# Patient Record
Sex: FEMALE | Race: WHITE
[De-identification: names, ages, dates, MRNs, and addresses within clinical notes are randomized per-mention and may not be internally consistent; named-entity substitution may affect disease eponyms.]

---

## 2019-08-21 ENCOUNTER — HOSPITAL ENCOUNTER (OUTPATIENT)
Dept: HOSPITAL 75 - RAD FS | Age: 69
End: 2019-08-21
Attending: NURSE PRACTITIONER
Payer: MEDICARE

## 2019-08-21 DIAGNOSIS — M17.12: Primary | ICD-10-CM

## 2019-08-21 PROCEDURE — 73562 X-RAY EXAM OF KNEE 3: CPT

## 2019-08-21 NOTE — DIAGNOSTIC IMAGING REPORT
INDICATION: 

Left knee pain.



FINDINGS:

Three views of the left knee show slight narrowing of the medial

tibiofemoral joint space. The medial joint space measures 4 mm in

thickness whereas the lateral compartment measures 5 mm in

thickness. There are small osteophytes at the margins of the

articular surfaces. There are small osteophytes at the margins of

the patellofemoral joint.



IMPRESSION: 

Mild degenerative changes in the medial, tibiofemoral, and

patellofemoral joint spaces.



Dictated by: 



  Dictated on workstation # [id number]

## 2020-05-19 ENCOUNTER — HOSPITAL ENCOUNTER (OUTPATIENT)
Dept: HOSPITAL 75 - RAD FS | Age: 70
End: 2020-05-19
Attending: NURSE PRACTITIONER
Payer: MEDICARE

## 2020-05-19 DIAGNOSIS — M47.817: ICD-10-CM

## 2020-05-19 DIAGNOSIS — M51.17: Primary | ICD-10-CM

## 2020-05-19 PROCEDURE — 72100 X-RAY EXAM L-S SPINE 2/3 VWS: CPT

## 2020-05-19 NOTE — DIAGNOSTIC IMAGING REPORT
INDICATION: Back pain



AP and lateral views of the lumbar spine are obtained



The lumbar vertebrae are normal in height with no compression

deformity. There is disc space narrowing of mild to moderate

severity at L5-S1, L4-L5, and L3-L4. There is slight

retrolisthesis of L3-L4 by about 2 to 3 mm. There is diffuse

facet degenerative change from L3 through S1.



IMPRESSION:



Degenerative findings in the lumbar spine as listed above. Slight

retrolisthesis of L3-L4. No acute bony abnormality.



Dictated by: 



  Dictated on workstation # [id number]

## 2020-12-27 ENCOUNTER — HOSPITAL ENCOUNTER (EMERGENCY)
Dept: HOSPITAL 75 - ER FS | Age: 70
Discharge: HOME | End: 2020-12-27
Payer: MEDICARE

## 2020-12-27 VITALS — HEIGHT: 60.98 IN | BODY MASS INDEX: 40.96 KG/M2 | WEIGHT: 216.93 LBS

## 2020-12-27 VITALS — SYSTOLIC BLOOD PRESSURE: 144 MMHG | DIASTOLIC BLOOD PRESSURE: 61 MMHG

## 2020-12-27 DIAGNOSIS — F41.9: ICD-10-CM

## 2020-12-27 DIAGNOSIS — Z88.5: ICD-10-CM

## 2020-12-27 DIAGNOSIS — G89.18: Primary | ICD-10-CM

## 2020-12-27 LAB
ALBUMIN SERPL-MCNC: 3.6 GM/DL (ref 3.2–4.5)
ALP SERPL-CCNC: 106 U/L (ref 40–136)
ALT SERPL-CCNC: 11 U/L (ref 0–55)
BASOPHILS # BLD AUTO: 0 10^3/UL (ref 0–0.1)
BASOPHILS NFR BLD AUTO: 1 % (ref 0–10)
BILIRUB SERPL-MCNC: 1 MG/DL (ref 0.1–1)
BUN/CREAT SERPL: 30
CALCIUM SERPL-MCNC: 9.2 MG/DL (ref 8.5–10.1)
CHLORIDE SERPL-SCNC: 100 MMOL/L (ref 98–107)
CO2 SERPL-SCNC: 25 MMOL/L (ref 21–32)
CREAT SERPL-MCNC: 0.5 MG/DL (ref 0.6–1.3)
EOSINOPHIL # BLD AUTO: 0.1 10^3/UL (ref 0–0.3)
EOSINOPHIL NFR BLD AUTO: 2 % (ref 0–10)
GFR SERPLBLD BASED ON 1.73 SQ M-ARVRAT: > 60 ML/MIN
GLUCOSE SERPL-MCNC: 177 MG/DL (ref 70–105)
HCT VFR BLD CALC: 31 % (ref 35–52)
HGB BLD-MCNC: 10.2 G/DL (ref 11.5–16)
LYMPHOCYTES # BLD AUTO: 0.8 X 10^3 (ref 1–4)
LYMPHOCYTES NFR BLD AUTO: 9 % (ref 12–44)
MANUAL DIFFERENTIAL PERFORMED BLD QL: NO
MCH RBC QN AUTO: 27 PG (ref 25–34)
MCHC RBC AUTO-ENTMCNC: 33 G/DL (ref 32–36)
MCV RBC AUTO: 82 FL (ref 80–99)
MONOCYTES # BLD AUTO: 0.6 X 10^3 (ref 0–1)
MONOCYTES NFR BLD AUTO: 7 % (ref 0–12)
NEUTROPHILS # BLD AUTO: 7 X 10^3 (ref 1.8–7.8)
NEUTROPHILS NFR BLD AUTO: 80 % (ref 42–75)
PLATELET # BLD: 386 10^3/UL (ref 130–400)
PMV BLD AUTO: 9.3 FL (ref 7.4–10.4)
POTASSIUM SERPL-SCNC: 4 MMOL/L (ref 3.6–5)
PROT SERPL-MCNC: 6.5 GM/DL (ref 6.4–8.2)
SODIUM SERPL-SCNC: 137 MMOL/L (ref 135–145)
WBC # BLD AUTO: 8.7 10^3/UL (ref 4.3–11)

## 2020-12-27 PROCEDURE — 36415 COLL VENOUS BLD VENIPUNCTURE: CPT

## 2020-12-27 PROCEDURE — 80053 COMPREHEN METABOLIC PANEL: CPT

## 2020-12-27 PROCEDURE — 73560 X-RAY EXAM OF KNEE 1 OR 2: CPT

## 2020-12-27 PROCEDURE — 86141 C-REACTIVE PROTEIN HS: CPT

## 2020-12-27 PROCEDURE — 85025 COMPLETE CBC W/AUTO DIFF WBC: CPT

## 2020-12-27 NOTE — DIAGNOSTIC IMAGING REPORT
EXAMINATION: Left knee radiographs, 2 views.



COMPARISON: August 21, 2019. 



HISTORY: 70-year-old female, swelling and redness. 



FINDINGS: There is a total left knee prosthesis. The hardware

appears intact. There is no periprosthetic lucency. There is no

cortical or aggressive bone destruction. There is no identified

acute fracture. There is no large knee joint effusion. There is

no identified unexpected radiopaque foreign body. 



IMPRESSION: Intact total left knee prosthesis without apparent

complication. 



Dictated by: 



  Dictated on workstation # [id number]

## 2020-12-28 NOTE — ED GENERAL
General


Chief Complaint:  Lower Extremity


Stated Complaint:  LEFT LEG SWOLLEN,HOT TO TOUCH: SURGERY ON 12/21


Nursing Triage Note:  


PT ARRIVED BY PRIVATE VEHICLE WITH CHIEF COMPLAINT OF LEFT KNEE/LEG SWELLING. PT




WAS ALERT, ORIENTED X 4 AND WHEELED TO ROOM 4 WHERE HER VITAL SIGNS WERE TAKEN. 


DOPPLER WAS USED TO FIND DORSALIS PEDIS AND POSTERIOR TIBIALIS. PT HAD SURGERY 


AT ORTHO 4 STATES ON 12/21 AND LEFT ON 12/24. PT HAD LAST DRESSING CHANGE ON 


12/22. THERE IS A DRAINAGE SPOT ON HER DRESSING. PT HAS SEVERE SWELLING IN LEFT 


LEG AND BRUISING. PT STATED SHE TAKES A BABY ASPIRIN 81 MG A DAY AND OXYCODONE 


EVERY 4 HOURS FOR PAIN. PT HAS BEEN TRYING TO WALK ON LEG AND GET UP AND DOWN, 


BUT IN A LOT OF PAIN. PT IS SUPPOSED TO DO PHYSICIAL THERAPY ON MONDAY. PT 


STATED IT IS WARM TO THE TOUCH. PT STATED SHE DOES NOT LIKE HYDROCODENE.


Nursing Sepsis Screen:  No Definite Risk


Source of Information:  Patient


Exam Limitations:  No Limitations





History of Present Illness


Date Seen by Provider:  Dec 28, 2020


Time Seen by Provider:  17:30


Initial Comments


Patient is a 70-year-old female who is 6 days status post left knee replacement 

presents with increased left leg pain swelling left knee. Patient reports 

increased pain with ambulation and movement. Denies falls or injuries. No fevers

chills, nausea vomiting or sweats. Denies increased drainage from bandage. 

Patient states she was walking on the leg for 3 hours last evening prior to leg 

hurting today. No chest pain shortness of breath. No other symptoms or 

complaints. Patient is not currently anticoagulated.


Timing/Duration:  12-24 Hours


Severity:  Moderate


Modifying Factors:  improves with Movement


Associated Systoms:  Other





Allergies and Home Medications


Allergies


Coded Allergies:  


     hydrocodone (Verified  Allergy, Unknown, 12/27/20)





Patient Home Medication List


Home Medication List Reviewed:  Yes





Review of Systems


Review of Systems


Constitutional:  see HPI


EENTM:  see HPI


Respiratory:  see HPI


Cardiovascular:  see HPI


Gastrointestinal:  see HPI


Genitourinary:  see HPI


Musculoskeletal:  see HPI


Skin:  see HPI


Psychiatric/Neurological:  See HPI


Hematologic/Lymphatic:  See HPI


Immunological/Allergic:  see HPI





All Other Systems Reviewed


Negative Unless Noted:  Yes





Past Medical-Social-Family Hx


Past Med/Social Hx:  Reviewed Nursing Past Med/Soc Hx


Patient Social History


Recent Foreign Travel:  No


Contact w/Someone Who Travel:  No


Recent Infectious Disease Expo:  No


Recent Hopitalizations:  Yes (LEFT KNEE REPLACEMENT)


Physical Abuse:  No


Sexual Abuse:  No


Mistreated:  No


Fear:  No





Seasonal Allergies


Seasonal Allergies:  No





Past Medical History


Surgeries:  Yes (BILATERAL KNEE REPLACEMENT)


Adenoidectomy, Gallbladder, Joint Replacement, Tonsillectomy


Respiratory:  No


Cardiac:  Yes


Hypertension


Neurological:  No


Genitourinary:  No


Gastrointestinal:  No


Musculoskeletal:  Yes (OSTEOARTHRITIS, BILAT KNEE REPLACMENT)


Arthritis


Endocrine:  No (ON BOARDER FOR DM)


HEENT:  No


Cancer:  No


Psychosocial:  No


Blood Disorders:  No





Physical Exam


Vital Signs





Vital Signs - First Documented








 12/27/20





 15:20


 


Temp 37.2


 


Pulse 100


 


Resp 20


 


B/P (MAP) 150/77 (101)


 


Pulse Ox 98


 


O2 Delivery Room Air





Capillary Refill : Less Than 3 Seconds


Height, Weight, BMI


Height: '"


Weight: lbs. oz. kg; 41.00 BMI


Method:


General Appearance:  Anxious, Mild Distress


Eyes:  Bilateral Eye Normal Inspection


HEENT:  PERRL/EOMI, Pharynx Normal


Neck:  Full Range of Motion, Normal Inspection, Non Tender


Extremity:  Swelling (midline incision left leg/knee, wound clean dry and 

intact. The joint is not hot or is excessively painful with range of motion. 

Extensive old bruising for a anterior leg. Pulses present. Calf does not feel 

tense), Other


Neurologic/Psychiatric:  Oriented x3, No Motor/Sensory Deficits





Focused Exam


Sepsis Stage:  Ruled Out





Progress/Results/Core Measures


Suspected Sepsis


Recent Fever Within 48 Hours:  No


Infection Criteria Present:  Suspected New Infection


New/Unexplained  Altered Menta:  No


Sepsis Screen:  No Definite Risk


SIRS


Temperature: 


Pulse: 92 


Respiratory Rate: 18


 


Laboratory Tests


12/27/20 15:40: White Blood Count 8.7


Blood Pressure 144 /61 


Mean: 101


 


Laboratory Tests


12/27/20 15:40: 


Creatinine 0.50L, Platelet Count 386, Total Bilirubin 1.0








Results/Orders


Lab Results





Laboratory Tests








Test


 12/27/20


15:40 Range/Units


 


 


White Blood Count


 8.7 


 4.3-11.0


10^3/uL


 


Red Blood Count


 3.78 L


 4.35-5.85


10^6/uL


 


Hemoglobin 10.2 L 11.5-16.0  G/DL


 


Hematocrit 31 L 35-52  %


 


Mean Corpuscular Volume 82  80-99  FL


 


Mean Corpuscular Hemoglobin 27  25-34  PG


 


Mean Corpuscular Hemoglobin


Concent 33 


 32-36  G/DL





 


Red Cell Distribution Width 14.1  10.0-14.5  %


 


Platelet Count


 386 


 130-400


10^3/uL


 


Mean Platelet Volume 9.3  7.4-10.4  FL


 


Immature Granulocyte % (Auto) 2   %


 


Neutrophils (%) (Auto) 80 H 42-75  %


 


Lymphocytes (%) (Auto) 9 L 12-44  %


 


Monocytes (%) (Auto) 7  0-12  %


 


Eosinophils (%) (Auto) 2  0-10  %


 


Basophils (%) (Auto) 1  0-10  %


 


Neutrophils # (Auto) 7.0  1.8-7.8  X 10^3


 


Lymphocytes # (Auto) 0.8 L 1.0-4.0  X 10^3


 


Monocytes # (Auto) 0.6  0.0-1.0  X 10^3


 


Eosinophils # (Auto)


 0.1 


 0.0-0.3


10^3/uL


 


Basophils # (Auto)


 0.0 


 0.0-0.1


10^3/uL


 


Immature Granulocyte # (Auto)


 0.1 


 0.0-0.1


10^3/uL


 


Sodium Level 137  135-145  MMOL/L


 


Potassium Level 4.0  3.6-5.0  MMOL/L


 


Chloride Level 100    MMOL/L


 


Carbon Dioxide Level 25  21-32  MMOL/L


 


Anion Gap 12  5-14  MMOL/L


 


Blood Urea Nitrogen 15  7-18  MG/DL


 


Creatinine


 0.50 L


 0.60-1.30


MG/DL


 


Estimat Glomerular Filtration


Rate > 60 


  





 


BUN/Creatinine Ratio 30   


 


Glucose Level 177 H   MG/DL


 


Calcium Level 9.2  8.5-10.1  MG/DL


 


Corrected Calcium 9.5  8.5-10.1  MG/DL


 


Total Bilirubin 1.0  0.1-1.0  MG/DL


 


Aspartate Amino Transf


(AST/SGOT) 11 


 5-34  U/L





 


Alanine Aminotransferase


(ALT/SGPT) 11 


 0-55  U/L





 


Alkaline Phosphatase 106    U/L


 


C-Reactive Protein 8.39 H <0.50  MG/DL


 


Total Protein 6.5  6.4-8.2  GM/DL


 


Albumin 3.6  3.2-4.5  GM/DL








Jennifer Keane - LESA COLEMAN DO


Cbc With Automated Diff (12/27/20 15:34)


Comprehensive Metabolic Panel (12/27/20 15:34)


Crp Fs (12/27/20 15:34)


Knee 2 View Left (12/27/20 15:37)


Enoxaparin Injection (Lovenox Injection) (12/27/20 17:30)





Vital Signs/I&O


Capillary Refill : Less Than 3 Seconds








Blood Pressure Mean:                    101











Departure


Communication (Admissions)


Left knee: Soft tissue swelling only.





Labyrinthine imaging reviewed. Symptoms most consistent with increased pain and 

swelling secondary to aggressive use. Infection considered but not clinically 

evident at this point. Arthrocentesis not currently indicated. Case reviewed 

with orthopedic provider who will see in clinic in 2 days. Lovenox given with 

order for CV ultrasound to performed next day. Return precautions reviewed.





Impression





   Primary Impression:  


   Postoperative pain of extremity


Disposition:  01 HOME, SELF-CARE


Condition:  Stable





Departure-Patient Inst.


Decision time for Depature:  17:15





Add. Discharge Instructions:  


Please follow up with [facility] tomorrow to arrange for outpatient US of leg and 

cotnact your orthopaeidic provider for re-evaluation in the next one two days. 





All discharge instructions reviewed with patient and/or family. Voiced 

understanding.











LESA COLEMAN DO                   Dec 28, 2020 07:03

## 2021-08-23 ENCOUNTER — HOSPITAL ENCOUNTER (OUTPATIENT)
Dept: HOSPITAL 75 - INFUSION | Age: 71
End: 2021-08-23
Attending: STUDENT IN AN ORGANIZED HEALTH CARE EDUCATION/TRAINING PROGRAM
Payer: MEDICARE

## 2021-08-23 VITALS — SYSTOLIC BLOOD PRESSURE: 147 MMHG | DIASTOLIC BLOOD PRESSURE: 57 MMHG

## 2021-08-23 VITALS — DIASTOLIC BLOOD PRESSURE: 65 MMHG | SYSTOLIC BLOOD PRESSURE: 139 MMHG

## 2021-08-23 VITALS — BODY MASS INDEX: 56.04 KG/M2 | HEIGHT: 60.63 IN | WEIGHT: 293 LBS

## 2021-08-23 DIAGNOSIS — U07.1: ICD-10-CM

## 2021-08-23 DIAGNOSIS — Z23: Primary | ICD-10-CM

## 2022-03-15 ENCOUNTER — HOSPITAL ENCOUNTER (OUTPATIENT)
Dept: HOSPITAL 75 - RAD FS | Age: 72
End: 2022-03-15
Attending: NURSE PRACTITIONER
Payer: MEDICARE

## 2022-03-15 DIAGNOSIS — M25.851: Primary | ICD-10-CM

## 2022-03-15 PROCEDURE — 73502 X-RAY EXAM HIP UNI 2-3 VIEWS: CPT

## 2022-03-15 NOTE — DIAGNOSTIC IMAGING REPORT
HISTORY: Pain in the right hip.



EXAMINATION: Right hip from 03/15/2022.



FINDINGS: Single view of the pelvis with two views of the right

hip.



There is a small linear density adjacent to the superolateral

aspect of the right acetabulum which could represent an os

acetabuli. A small fracture fragment is less likely but not

excluded. The remaining osseous structures are intact. Mild

narrowing is seen in both hips.



IMPRESSION:

1. Age-indeterminate density adjacent to the superolateral right

acetabulum. Correlate with symptoms.



Dictated by: 



  Dictated on workstation # [id number]

## 2022-03-27 ENCOUNTER — HOSPITAL ENCOUNTER (EMERGENCY)
Dept: HOSPITAL 75 - ER FS | Age: 72
Discharge: HOME | End: 2022-03-27
Payer: MEDICARE

## 2022-03-27 VITALS — SYSTOLIC BLOOD PRESSURE: 162 MMHG | DIASTOLIC BLOOD PRESSURE: 95 MMHG

## 2022-03-27 DIAGNOSIS — S00.03XA: Primary | ICD-10-CM
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
## 2022-03-27 PROCEDURE — 70450 CT HEAD/BRAIN W/O DYE: CPT

## 2022-03-27 PROCEDURE — 72125 CT NECK SPINE W/O DYE: CPT

## 2022-03-27 NOTE — ED GENERAL
General


Chief Complaint:  Trauma-Non Activation


Stated Complaint:  HEAD INJURY


Nursing Triage Note:  


Pt states she was carrying groceries up her steps and fell backward, hitting her




head on the concrete. Pt denies loc and is alert and oriented on arrival





History of Present Illness


Date Seen by Provider:  Mar 27, 2022


Time Seen by Provider:  21:15


Initial Comments


71-year-old female is here with complaints of swelling to her scalp after she 

had a fall today.  Patient had a fall backwards off her front porch steps, 2 in 

number.  She hit the back of her head on the ground but did not have LOC.  

Patient was carrying a water jug to her porch and the jug fell towards her 

causing her to fall backwards.  Denies dizziness, headache, blurry vision, chest

pain, palpitations.





Allergies and Home Medications


Allergies


Coded Allergies:  


     hydrocodone (Verified  Allergy, Unknown, 20)





Patient Home Medication List


Home Medication List Reviewed:  Yes





Review of Systems


Review of Systems


Constitutional:  no symptoms reported


EENTM:  other (scalp swelling)


Respiratory:  no symptoms reported


Cardiovascular:  no symptoms reported


Gastrointestinal:  no symptoms reported


Genitourinary:  no symptoms reported


Musculoskeletal:  no symptoms reported


Skin:  no symptoms reported


Psychiatric/Neurological:  No Symptoms Reported


Hematologic/Lymphatic:  No Symptoms Reported


Immunological/Allergic:  no symptoms reported





Past Medical-Social-Family Hx


Patient Social History


Tobacco Use?:  No


Use of E-Cig and/or Vaping dev:  No


Substance use?:  No


Alcohol Use?:  No


Pt feels they are or have been:  No





Seasonal Allergies


Seasonal Allergies:  No





Past Medical History


Surgeries:  Yes (BILATERAL KNEE REPLACEMENT)


Adenoidectomy, Gallbladder, Joint Replacement, Tonsillectomy


Respiratory:  No


Cardiac:  Yes


Hypertension


Neurological:  No


Genitourinary:  No


Gastrointestinal:  No


Musculoskeletal:  Yes (OSTEOARTHRITIS, BILAT KNEE REPLACMENT)


Arthritis


Endocrine:  No (ON BOARDER FOR DM)


HEENT:  No


Cancer:  No


Psychosocial:  No


Blood Disorders:  No





Physical Exam


Vital Signs





Vital Signs - First Documented








 3/27/22





 21:06


 


Temp 36.7


 


Pulse 80


 


Resp 18


 


B/P (MAP) 168/119 (135)


 


Pulse Ox 96


 


O2 Delivery Room Air





Capillary Refill : Less Than 3 Seconds


Height, Weight, BMI


Height: '"


Weight: lbs. oz. kg; 41.00 BMI


Method:


General Appearance:  No Apparent Distress


HEENT:  PERRL/EOMI, TMs Normal


Neck:  Full Range of Motion, Normal Inspection, Non Tender, Supple


Extremity:  Normal Inspection, Normal Range of Motion, Non Tender


Neurologic/Psychiatric:  Alert, Oriented x3, No Motor/Sensory Deficits, Normal 

Mood/Affect, CNs II-XII Norm as Tested


Skin:  Normal Color





Progress/Results/Core Measures


Suspected Sepsis


SIRS


Temperature: 


Pulse: 80 


Respiratory Rate: 18


 


Blood Pressure 168 /119 


Mean: 135





Results/Orders


My Orders





Orders - ROBERT DE LUNA MD


Ct Head/Cervical Spine Wo (3/27/22 21:11)





Vital Signs/I&O











 3/27/22





 21:06


 


Temp 36.7


 


Pulse 80


 


Resp 18


 


B/P (MAP) 168/119 (135)


 


Pulse Ox 96


 


O2 Delivery Room Air





Capillary Refill : Less Than 3 Seconds








Blood Pressure Mean:                    135








Progress Note :  


Progress Note


1. LEFT PARIETAL SCALP HEMATOMA:


- CT HEAD & C-SPINE: left parietal scalp hematoma


- F/u with PCP


- Concussion precautions discussed. No concussion at this time.


-The patient was seen in the ED, and treated appropriately to presentation at a 

specific point in time. Patient is informed that there is a possibility that 

disease and illness can evolve and change in acuity rapidly or slowly after 

patient is discharged from the ER. Precautionary advice given to the patient for

immediate return to ER if symptoms worsen or do not resolve, and to seek emergen

cy care sooner rather than later. Pt also advised on the importance of PCP 

follow up and compliance with management and follow up plan. Pt verbally 

expressed understanding.





Diagnostic Imaging





   Diagonstic Imaging:  CT


   Plain Films/CT/US/NM/MRI:  c-spine, head


Comments


                 [facility] [place], [place]





NAME:   MATILDA CIFUENTES


MED REC#:   [id number]


ACCOUNT#:   [id number]


PT STATUS:   REG ER


:   1950


PHYSICIAN:   ROBERT DE LUNA MD


ADMIT DATE:   22/ER FS


                                  ***Signed***


Date of Exam:22





CT HEAD/CERVICAL SPINE WO








PROCEDURE: CT head and CT cervical spine without contrast.





TECHNIQUE: Multiple contiguous axial images were obtained through


the brain and cervical spine without the use of intravenous


contrast. Sagittal and coronal reformations through the cervical


spine were then performed. Auto Exposure Controls were utilized


during the CT exam to meet ALARA standards for radiation dose


reduction. 





INDICATION: Fall. Head and neck pain.





COMPARISON: None.





FINDINGS: 





CT HEAD: No large acute territorial ischemia, mass or hemorrhage.


No midline shift or mass effect. Scattered areas of decreased


attenuation are seen in the periventricular and subcortical white


matter. The ventricles, cortical sulci and basilar cisterns are


patent and unremarkable. 





A scalp contusion is seen overlying the left parietal region. The


calvarium is intact. The visualized paranasal sinuses are clear.





CT CERVICAL SPINE: No acute fracture or dislocation is seen in


the cervical spine. There is reversal of the normal lordotic


curvature of the cervical spine centered at the C5-C6 level. No


focal osseous lesion. Vertebral body heights are well maintained.


The craniocervical junction is well maintained. Mild degenerative


changes are seen in the cervical spine with disc osteophyte


complexes and uncovertebral arthropathy. Soft tissues of the neck


are unremarkable. The included lung apices are clear.





IMPRESSION:  


1. No hemorrhage or focal intra-axial mass. No CT evidence of


large acute territorial ischemia.  


2. No acute fracture or dislocation in the cervical spine.


3. Scalp contusion overlying the left parietal region. No


associated calvarial fracture.


4. Scattered chronic microvascular disease in the periventricular


and subcortical white matter.





Departure


Impression





   Primary Impression:  


   Left parietal scalp hematoma


   Qualified Codes:  S00.03XA - Contusion of scalp, initial encounter


Disposition:  01 HOME, SELF-CARE


Condition:  Stable





Departure-Patient Inst.


Referrals:  


SERENA MARTINO (PCP)


Primary Care Physician








[facility] (Family)


Primary Care Physician


Patient Instructions:  Contusion (DC), Concussion, Adult (DC)





Add. Discharge Instructions:  


- F/u with PCP


- Concussion precautions discussed. No concussion at this time.


-The patient was seen in the ED, and treated appropriately to presentation at a 

specific point in time. Patient is informed that there is a possibility that 

disease and illness can evolve and change in acuity rapidly or slowly after 

patient is discharged from the ER. Precautionary advice given to the patient for

immediate return to ER if symptoms worsen or do not resolve, and to seek 

emergency care sooner rather than later. Pt also advised on the importance of 

PCP follow up and compliance with management and follow up plan. Pt verbally 

expressed understanding. 





All discharge instructions reviewed with patient and/or family. Voiced 

understanding.











ROBERT DE LUNA MD              Mar 27, 2022 21:43

## 2022-03-27 NOTE — DIAGNOSTIC IMAGING REPORT
PROCEDURE: CT head and CT cervical spine without contrast.



TECHNIQUE: Multiple contiguous axial images were obtained through

the brain and cervical spine without the use of intravenous

contrast. Sagittal and coronal reformations through the cervical

spine were then performed. Auto Exposure Controls were utilized

during the CT exam to meet ALARA standards for radiation dose

reduction. 



INDICATION: Fall. Head and neck pain.



COMPARISON: None.



FINDINGS: 



CT HEAD: No large acute territorial ischemia, mass or hemorrhage.

No midline shift or mass effect. Scattered areas of decreased

attenuation are seen in the periventricular and subcortical white

matter. The ventricles, cortical sulci and basilar cisterns are

patent and unremarkable. 



A scalp contusion is seen overlying the left parietal region. The

calvarium is intact. The visualized paranasal sinuses are clear.



CT CERVICAL SPINE: No acute fracture or dislocation is seen in

the cervical spine. There is reversal of the normal lordotic

curvature of the cervical spine centered at the C5-C6 level. No

focal osseous lesion. Vertebral body heights are well maintained.

The craniocervical junction is well maintained. Mild degenerative

changes are seen in the cervical spine with disc osteophyte

complexes and uncovertebral arthropathy. Soft tissues of the neck

are unremarkable. The included lung apices are clear.



IMPRESSION:  

1. No hemorrhage or focal intra-axial mass. No CT evidence of

large acute territorial ischemia.  

2. No acute fracture or dislocation in the cervical spine.

3. Scalp contusion overlying the left parietal region. No

associated calvarial fracture.

4. Scattered chronic microvascular disease in the periventricular

and subcortical white matter. 



 



Dictated by: 



  Dictated on workstation # [id number]

## 2022-06-27 ENCOUNTER — HOSPITAL ENCOUNTER (EMERGENCY)
Dept: HOSPITAL 75 - ER FS | Age: 72
Discharge: HOME | End: 2022-06-27
Payer: MEDICARE

## 2022-06-27 VITALS — HEIGHT: 68.9 IN | WEIGHT: 138.89 LBS | BODY MASS INDEX: 20.57 KG/M2

## 2022-06-27 VITALS — SYSTOLIC BLOOD PRESSURE: 131 MMHG | DIASTOLIC BLOOD PRESSURE: 72 MMHG

## 2022-06-27 DIAGNOSIS — U07.1: Primary | ICD-10-CM

## 2022-06-27 PROCEDURE — 87636 SARSCOV2 & INF A&B AMP PRB: CPT

## 2022-06-27 PROCEDURE — 99283 EMERGENCY DEPT VISIT LOW MDM: CPT

## 2022-06-27 NOTE — ED RESPIRATORY
General


Chief Complaint:  COVID19 Suspect/Confirmed


Stated Complaint:  COVID POSITIVE


Nursing Triage Note:  


Patient has presented to ER with cc of a positive home Covid test.  Patient 


reports that she started to feel sick yesterday.  She complains of cough, 


congestion, and body aches.   She is concerned about the positive test and came 


to ER for evaluation.





History of Present Illness


Date Seen by Provider:  Jun 27, 2022


Time Seen by Provider:  12:56


Initial Comments


71-year-old female here with complaints of positive COVID home test.  Son is in 

the room and had the same thing.  She is here to get further tested.  Having 

some cough shortness of breath.  No fever or chills.  Symptoms started yesterday


Timing/Duration:  yesterday





Allergies and Home Medications


Allergies


Coded Allergies:  


     hydrocodone (Verified  Allergy, Unknown, 12/27/20)





Patient Home Medication List


Home Medication List Reviewed:  Yes


Molnupiravir (Molnupiravir (Eua)) 200 Mg Capsule, 800 MG PO BID


   Prescribed by: Marcelo Freeman on 6/27/22 1620





Review of Systems


Review of Systems


Constitutional:  see HPI





Past Medical-Social-Family Hx


Patient Social History


Tobacco Use?:  No


Use of E-Cig and/or Vaping dev:  No


Substance use?:  No


Alcohol Use?:  No





Seasonal Allergies


Seasonal Allergies:  No





Past Medical History


Surgeries:  Yes (BILATERAL KNEE REPLACEMENT)


Adenoidectomy, Gallbladder, Joint Replacement, Tonsillectomy


Respiratory:  No


Cardiac:  Yes


Hypertension


Neurological:  No


Genitourinary:  No


Gastrointestinal:  No


Musculoskeletal:  Yes (OSTEOARTHRITIS, BILAT KNEE REPLACMENT)


Arthritis


Endocrine:  No (ON BOARDER FOR DM)


HEENT:  No


Cancer:  No


Psychosocial:  No


Blood Disorders:  No





Physical Exam





Vital Signs - First Documented








 6/27/22





 12:32


 


Temp 36.4


 


Pulse 60


 


Resp 16


 


B/P (MAP) 131/72 (91)


 


Pulse Ox 98


 


O2 Delivery Room Air





Capillary Refill :


Height: '"


Weight: lbs. oz. kg; 20.00 BMI


Method:


General Appearance:  WD/WN, no apparent distress


HEENT:  PERRL/EOMI, pharynx normal


Neck:  full range of motion, supple


Respiratory:  lungs clear, normal breath sounds; No crackles, No rales, No 

rhonchi


Cardiovascular:  regular rate, rhythm, no edema


Neurologic/Psychiatric:  alert, normal mood/affect





Progress/Results/Core Measures


Suspected Sepsis


SIRS


Temperature: 


Pulse: 60 


Respiratory Rate: 16


 


Blood Pressure 131 /72 


Mean: 91





Results/Orders


Lab Results





Laboratory Tests








Test


 6/27/22


13:05 Range/Units


 


 


Influenza Type A (RT-PCR) Not Detected  Not Detecte  


 


Influenza Type B (RT-PCR) Not Detected  Not Detecte  


 


SARS-CoV-2 RNA (RT-PCR) Detected H Not Detecte  








My Orders





Orders - MARCELO FREEMAN MD


Covid 19 Inhouse Test (6/27/22 13:01)


Influenza A And B By Pcr (6/27/22 13:01)


Isolation Central Supply Req (6/27/22 13:01)





Vital Signs/I&O











 6/27/22





 12:32


 


Temp 36.4


 


Pulse 60


 


Resp 16


 


B/P (MAP) 131/72 (91)


 


Pulse Ox 98


 


O2 Delivery Room Air





Capillary Refill :








Blood Pressure Mean:                    91











Departure


Impression





   Primary Impression:  


   COVID-19


Disposition:  01 HOME, SELF-CARE


Condition:  Stable





Departure-Patient Inst.


Referrals:  


SERENA MARTINO (PCP)


Primary Care Physician








[facility] (Family)


Primary Care Physician


Patient Instructions:  COVID-19 Home Care/Discharge


Scripts


Molnupiravir (Molnupiravir (Eua)) 200 Mg Capsule


800 MG PO BID for 5 Days, #40 CAP 0 Refills


   Prov: MARCELO FREEMAN MD         6/27/22











MARCELO FREEMAN MD         Jun 27, 2022 13:11

## 2022-07-08 ENCOUNTER — HOSPITAL ENCOUNTER (EMERGENCY)
Dept: HOSPITAL 75 - ER FS | Age: 72
Discharge: HOME | End: 2022-07-08
Payer: MEDICARE

## 2022-07-08 VITALS — BODY MASS INDEX: 27.27 KG/M2 | WEIGHT: 138.89 LBS | HEIGHT: 60 IN

## 2022-07-08 VITALS — SYSTOLIC BLOOD PRESSURE: 166 MMHG | DIASTOLIC BLOOD PRESSURE: 79 MMHG

## 2022-07-08 DIAGNOSIS — Z73.0: ICD-10-CM

## 2022-07-08 DIAGNOSIS — J20.8: ICD-10-CM

## 2022-07-08 DIAGNOSIS — U07.1: Primary | ICD-10-CM

## 2022-07-08 PROCEDURE — 99282 EMERGENCY DEPT VISIT SF MDM: CPT

## 2022-07-08 NOTE — ED COUGH/URI
General


Chief Complaint:  Cough/Cold/Flu Symptoms


Stated Complaint:  COUGH


Nursing Triage Note:  


Patient presents to the ED with c/o persistent cough. States that she was COVID 


+ 6/27/2022 and finished antiviral medication 7/2/2022. Reports persistent 


productive cough with no improvement since finishing antiviral medication. Was 


seen at urgent care and sent to the ED for further evaluation.


Source:  patient


Exam Limitations:  no limitations





History of Present Illness


Date Seen by Provider:  Jul 8, 2022


Time Seen by Provider:  17:00


Initial Comments


Patient is a 71-year-old female who is 2 weeks post COVID diagnosis who presents

with persistent nasal congestion rhinorrhea postnasal drip and cough.  No chest 

pain palpitations, shortness of breath wheezing, leg pain or swelling.  No 

history DVT or PE.  No fever chills or sweats.  Patient was seen at [facility] 

with O2 sats greater than 94% referred to the ED for additional evaluation.


Timing/Duration:  yesterday


Severity/Quality:  other


Prior Episodes/Possible Cause:  other


Modifying Factors:  Improves With Other


Associated Symptoms:  other





Allergies and Home Medications


Allergies


Coded Allergies:  


     hydrocodone (Verified  Allergy, Unknown, 12/27/20)





Patient Home Medication List


Home Medication List Reviewed:  Yes


Molnupiravir (Molnupiravir (Eua)) 200 Mg Capsule, 800 MG PO BID


   Prescribed by: Steven Freeman on 6/27/22 1620





Review of Systems


Review of Systems


Constitutional:  see HPI


EENTM:  see HPI


Respiratory:  see HPI


Cardiovascular:  see HPI


Gastrointestinal:  see HPI


Musculoskeletal:  see HPI


Skin:  see HPI


Psychiatric/Neurological:  See HPI


Hematologic/Lymphatic:  See HPI


Immunological/Allergic:  see HPI





All Other Systems Reviewed


Negative Unless Noted:  Yes





Past Medical-Social-Family Hx


Patient Social History


Tobacco Use?:  No


Substance use?:  No


Alcohol Use?:  No


Pt feels they are or have been:  No





Immunizations Up To Date


First/Initial COVID19 Vaccinat:  Yes


Second COVID19 Vaccination Gumaro:  Yes


COVID19 Vaccine :  Neocleus





Seasonal Allergies


Seasonal Allergies:  No





Past Medical History


Surgery/Hospitalization HX:  


HTN; High Cholesterol; Recent COVID 6/27/2022; Hysterectomy; Parkinson; Chronic 


joint pain; Asthma


Surgeries:  Yes (BILATERAL KNEE REPLACEMENT)


Adenoidectomy, Gallbladder, Joint Replacement, Tonsillectomy


Respiratory:  No


Cardiac:  Yes


Hypertension


Neurological:  No


Genitourinary:  No


Gastrointestinal:  No


Musculoskeletal:  Yes (OSTEOARTHRITIS, BILAT KNEE REPLACMENT)


Arthritis


Endocrine:  No (ON BOARDER FOR DM)


HEENT:  No


Cancer:  No


Psychosocial:  No


Blood Disorders:  No





Physical Exam





Vital Signs - First Documented








 7/8/22





 16:54


 


Temp 37.0


 


Pulse 71


 


Resp 20


 


B/P (MAP) 176/77 (110)


 


Pulse Ox 95


 


O2 Delivery Room Air





Capillary Refill : Less Than 3 Seconds


Height: '"


Weight: lbs. oz. kg; 27.00 BMI


Method:


General Appearance:  WD/WN, no apparent distress


Eyes:  Bilateral Eye Normal Inspection, Bilateral Eye PERRL


HEENT:  PERRL/EOMI, normal ENT inspection, other (rhinorrhea, nasal congestion)


Neck:  full range of motion


Respiratory:  chest non-tender, lungs clear, normal breath sounds


Cardiovascular:  normal peripheral pulses, regular rate, rhythm


Extremities:  non-tender, normal inspection


Neurologic/Psychiatric:  alert, oriented x 3





Focused Exam


Sepsis Stage:  Ruled Out





Progress/Results/Core Measures


Suspected Sepsis


SIRS


Temperature: 


Pulse: 71 


Respiratory Rate: 20


 


Blood Pressure 176 /77 


Mean: 110





Results/Orders


Vital Signs/I&O











 7/8/22





 16:54


 


Temp 37.0


 


Pulse 71


 


Resp 20


 


B/P (MAP) 176/77 (110)


 


Pulse Ox 95


 


O2 Delivery Room Air





Capillary Refill : Less Than 3 Seconds








Blood Pressure Mean:                    110











Departure


Communication (Admissions)


Patient with cough related symptoms.  Vital signs are stable with O2 sats 

greater than 97%.  No chest pain, shortness of breath wheezing or constitutional

symptoms.  I am comfortable treating the patient clinically with PCP follow-up..





Impression





   Primary Impression:  


   Acute bronchitis due to 2019 novel coronavirus


Disposition:  01 HOME, SELF-CARE


Condition:  Stable





Admissions


Decision to Admit/Date:  Jul 8, 2022


Time/Decision to Admit Time:  17:25





Departure-Patient Inst.


Decision time for Depature:  17:25


Referrals:  


SERENA MARTINO (PCP)


Primary Care Physician








[facility]/ANTONIO (Family)


Primary Care Physician


Patient Instructions:  Acute Bronchitis





Add. Discharge Instructions:  


Your symptoms are consistent with acute bronchitis due to COVID virus.  Please 

take newly prescribed medication as directed follow-up with your PCP as needed. 

Return to the ED if new or worsening symptoms.





All discharge instructions reviewed with patient and/or family. Voiced 

understanding.


Scripts


Benzonatate (TESSALON PERLES) 100 Mg Capsule


200 MG PO BID, #30 CAP


   Prov: LESA COLEMAN DO         7/8/22 


Albuterol Sulfate (Proventil Hfa) 6.7 Gm Hfa.aer.ad


2 PUFF INH Q6H for SHORTNESS OF BREATH, #1 EACH


   Prov: LESA COLEMAN DO         7/8/22 


Guaifenesin (Mucinex) 1,200 Mg Tab.er.12h


1200 MG PO BID, #30 TAB


   Prov: LESA COLEMAN DO         7/8/22











LESA COLEMAN DO                    Jul 8, 2022 17:25

## 2022-09-08 ENCOUNTER — HOSPITAL ENCOUNTER (OUTPATIENT)
Dept: HOSPITAL 75 - RAD FS | Age: 72
End: 2022-09-08
Attending: NURSE PRACTITIONER
Payer: MEDICARE

## 2022-09-08 DIAGNOSIS — Z91.81: ICD-10-CM

## 2022-09-08 DIAGNOSIS — M25.551: Primary | ICD-10-CM

## 2022-09-08 PROCEDURE — 73502 X-RAY EXAM HIP UNI 2-3 VIEWS: CPT

## 2022-09-08 NOTE — DIAGNOSTIC IMAGING REPORT
EXAMINATION: Pelvis and right hip radiograph



EXAM DATE: 9/8/2022 2:03 PM



COMPARISON: 3/15/2022



HISTORY: RIGHT HIP PAIN; STATUS POST FALL



TECHNIQUE: 3 views



FINDINGS: 



There is no acute fracture, dislocation, or destructive osseous

process. Stable mild joint space narrowing. The joint spaces are

otherwise normal. The soft tissues are normal.



IMPRESSION:

1. No acute osseous abnormality.



  Dictated on workstation # [id number]